# Patient Record
Sex: MALE | Race: WHITE
[De-identification: names, ages, dates, MRNs, and addresses within clinical notes are randomized per-mention and may not be internally consistent; named-entity substitution may affect disease eponyms.]

---

## 2017-01-09 NOTE — UC
Skin Complaint HPI





- HPI Summary


HPI Summary: 





bump on scrotum for several weeks. At one point it looked like a pustule and he 

squeezed some clear fluid out of it. Now it's just a bump. Mildly itchy. 

Occasionally looks red in the area. Itches more after sweating. Recent tinea 

infeciton on feet, concerned it may have spread to scrotum





- History of Current Complaint


Chief Complaint: UCSkin


Time Seen by Provider: 01/09/17 09:55


Stated Complaint: SKIN COMPLAINT


Hx Obtained From: Patient


Onset/Duration: Gradual Onset, Lasting Weeks - 3


Timing: Constant


Onset Severity: Mild


Current Severity: Mild


Location: Other - left side scrotum


Character: Pruritus, Raised


Aggravating: Touch


Alleviating: Other - sweating


Associated Signs & Symptoms: Positive: Rash


Similar Episode/Dx as: has had genital warts in past





- Allergy/Home Medications


Allergies/Adverse Reactions: 


 Allergies











Allergy/AdvReac Type Severity Reaction Status Date / Time


 


No Known Allergies Allergy   Verified 01/09/17 09:21














Review of Systems


Constitutional: Negative


Skin: Other - bump on scrotum


Eyes: Negative


ENT: Negative


Respiratory: Negative


Cardiovascular: Negative


Gastrointestinal: Negative


Genitourinary: Negative


Motor: Negative


Neurovascular: Negative


Musculoskeletal: Negative


Neurological: Negative


Psychological: Negative


All Other Systems Reviewed And Are Negative: Yes





PMH/Surg Hx/FS Hx/Imm Hx


Previously Healthy: Yes





- Surgical History


Surgical History: None





- Family History


Known Family History: Positive: None





- Social History


Occupation: Employed Full-time


Lives: With Family


Alcohol Use: Weekly


Substance Use Type: None


Smoking Status (MU): Never Smoked Tobacco





Physical Exam


Triage Information Reviewed: Yes


Appearance: Well-Appearing, No Pain Distress, Well-Nourished


Vital Signs: 


 Initial Vital Signs











Temp  98.9 F   01/09/17 09:22


 


Pulse  94   01/09/17 09:22


 


Resp  14   01/09/17 09:22


 


BP  154/136   01/09/17 09:22


 


Pulse Ox  100   01/09/17 09:22











Vital Signs Reviewed: Yes


Eye Exam: Normal


Neck exam: Normal


Respiratory Exam: Normal


Cardiovascular Exam: Normal


Abdominal Exam: Normal


Musculoskeletal Exam: Normal


Neurological Exam: Normal


Psychological Exam: Normal


Skin Exam: Other - one isolated nodule left side of scrotum. No umbilication. 

No warty appearance. Mobile, nontender. Skin is mildly reddened in area, not 

scaly, no clear demarcated border.





Course/Dx





- Differential Diagnoses - Skin Complaint


Differential Diagnoses: Contact Dermatitis





- Diagnoses


Provider Diagnoses: tinea cruris





Discharge





- Discharge Plan


Condition: Stable


Disposition: HOME


Prescriptions: 


Fluconazole 150 MG (NF) [Diflucan 150 mg (NF)] 150 mg PO ONCE #3 tab


Patient Education Materials:  Jock Itch (ED)


Referrals: 


No Primary Care Phys,NOPCP [Primary Care Provider] - 


Additional Instructions: 


The little bump does not have any characteristics of genital warts, or 

molluscum contagiosum, or Herpes. It appears to be a resolving scar from a 

superficial bacterial infection. Hydrocortisone cream should help it resolve. 

If there is any component of "jock itch", the oral medication will resolve it.

## 2018-08-10 ENCOUNTER — HOSPITAL ENCOUNTER (EMERGENCY)
Dept: HOSPITAL 25 - UCCORT | Age: 33
Discharge: HOME | End: 2018-08-10
Payer: COMMERCIAL

## 2018-08-10 VITALS — DIASTOLIC BLOOD PRESSURE: 78 MMHG | SYSTOLIC BLOOD PRESSURE: 142 MMHG

## 2018-08-10 DIAGNOSIS — R31.9: Primary | ICD-10-CM

## 2018-08-10 PROCEDURE — G0463 HOSPITAL OUTPT CLINIC VISIT: HCPCS

## 2018-08-10 PROCEDURE — 81003 URINALYSIS AUTO W/O SCOPE: CPT

## 2018-08-10 PROCEDURE — 87086 URINE CULTURE/COLONY COUNT: CPT

## 2018-08-10 PROCEDURE — 87491 CHLMYD TRACH DNA AMP PROBE: CPT

## 2018-08-10 PROCEDURE — 87591 N.GONORRHOEAE DNA AMP PROB: CPT

## 2018-08-10 PROCEDURE — 99212 OFFICE O/P EST SF 10 MIN: CPT

## 2018-08-10 NOTE — UC
Complaint Male HPI





- HPI Summary


HPI Summary: 





33 yo male was seen in urgent care in [place] 4 day ago for urinary urgency 

and gross hematuria


GC and Chlamydia (-)


given three days of cipro





now improved





requests more antibiotics











- History of Current Complaint


Chief Complaint: UCGU


Stated Complaint: URINARY COMPLAINT


Time Seen by Provider: 08/10/18 13:36


Hx Obtained From: Patient


Onset/Duration: Sudden Onset, Lasting Days


Timing: Constant


Severity Initially: Severe


Severity Currently: None


Pain Intensity: 0


Pain Scale Used: 0-10 Numeric


Aggravating Factor(s): Voiding





- Allergies/Home Medications


Allergies/Adverse Reactions: 


 Allergies











Allergy/AdvReac Type Severity Reaction Status Date / Time


 


No Known Allergies Allergy   Verified 01/09/17 09:21











Home Medications: 


 Home Medications





Ciprofloxacin HCl [Cipro] 500 mg PO BID 08/10/18 [History Confirmed 08/10/18]


FLUoxetine CAP* [Prozac CAP*] 10 mg PO DAILY 08/10/18 [History Confirmed 08/10/

18]











PMH/Surg Hx/FS Hx/Imm Hx


Previously Healthy: Yes





- Surgical History


Surgical History: None





- Family History


Known Family History: Positive: Hypertension





- Social History


Alcohol Use: Weekly


Substance Use Type: None


Smoking Status (MU): Never Smoked Tobacco





Review of Systems


Constitutional: Negative


Skin: Negative


Eyes: Negative


ENT: Negative


Respiratory: Negative


Cardiovascular: Negative


Gastrointestinal: Negative


Genitourinary: Hematuria, Frequency


Motor: Negative


Neurovascular: Negative


Musculoskeletal: Negative


Neurological: Negative


Psychological: Negative


Is Patient Immunocompromised?: No


All Other Systems Reviewed And Are Negative: Yes





Physical Exam


Triage Information Reviewed: Yes


Appearance: Well-Appearing, No Pain Distress, Well-Nourished


Vital Signs: 


 Initial Vital Signs











Temp  97.5 F   08/10/18 13:26


 


Pulse  63   08/10/18 13:26


 


Resp  14   08/10/18 13:26


 


BP  142/78   08/10/18 13:26


 


Pulse Ox  100   08/10/18 13:26











Vital Signs Reviewed: Yes


Eyes: Positive: Conjunctiva Clear


ENT: Positive: Hearing grossly normal.  Negative: Nasal congestion, Nasal 

drainage, Trismus, Muffled voice, Sinus tenderness, Uvula midline


Neck: Positive: Supple, Nontender, No Lymphadenopathy


Respiratory: Positive: Lungs clear, Normal breath sounds, No respiratory 

distress


Cardiovascular: Positive: RRR, No Murmur


Abdomen Description: Positive: Nontender, No Organomegaly, Soft.  Negative: CVA 

Tenderness (R), CVA Tenderness (L)


Bowel Sounds: Positive: Present


Male Genital Exam: Positive: Normal Genitalia


Musculoskeletal: Positive: ROM Intact, No Edema


Neurological: Positive: Alert


Psychological Exam: Normal


Skin Exam: Normal





Diagnostics





- Laboratory


Diagnostic Studies Completed/Ordered: urine dip (-)





 Complaint Male Course/Dx





- Differential Dx/Diagnosis


Provider Diagnoses: hematuria by history.  suspect he passed a stone





Discharge





- Sign-Out/Discharge


Documenting (check all that apply): Patient Departure





- Discharge Plan


Condition: Stable


Disposition: HOME


Prescriptions: 


Cephalexin CAP* [Keflex CAP*] 500 mg PO BID #8 cap


Patient Education Materials:  Hematuria (ED)


Referrals: 


Yolanda Car MD [Medical Doctor] - As Soon As Possible





- Billing Disposition and Condition


Condition: STABLE


Disposition: Home